# Patient Record
(demographics unavailable — no encounter records)

---

## 2025-07-01 NOTE — HISTORY OF PRESENT ILLNESS
[FreeTextEntry8] : Patient is a 32yo M with alcohol and cocaine abuse here as a new patient for hospital follow up.  "31M no significant PMHx with ETOH use disorder, substance abuse with cocaine pw ETOH withdrawal. Pt related started heavy drinking for the past month with about 12-15 beers a day and the past 2 weeks upon awakening with sxs of tremors, shakes, nausea that would resolve with ETOH. He has to attend an engagement this Saturday and wants to be sober for this event. Last drink was 11PM yesterday night. Otherwise no fevers, chills, SOB, CP, vomiting, abd pain, diarrhea or dysuria. +tremors, shakes, and sweats. In ED s/p 4mg IV Valium. Afebrile, HR 88 BP: 171/103 sat 100% on RA. Relevant Labs normal cbc ; 3.5 cr: 0.85 Mag; 1.5. TB: 1.6 AP: 87 AST/ALT; 50/42  Hospital Course Summary: Patient admitted and put on CIWA protocol with librium taper. Given thiamine, folic acid, and multivitamin supplementation. Patient tolerated taper well.  consulted and saw patient, recommended outpatient dual diagnosis program. Pt hemodynamically stable for discharge home today with PCP and outpatient behavorial health f/u."

## 2025-07-01 NOTE — INTERPRETER SERVICES
[Patient Declined  Services] : - None: Patient declined  services [FreeTextEntry3] :  Shared Language Indonesian with MD